# Patient Record
Sex: MALE | Race: WHITE | NOT HISPANIC OR LATINO | Employment: UNEMPLOYED | URBAN - METROPOLITAN AREA
[De-identification: names, ages, dates, MRNs, and addresses within clinical notes are randomized per-mention and may not be internally consistent; named-entity substitution may affect disease eponyms.]

---

## 2023-09-11 ENCOUNTER — HOSPITAL ENCOUNTER (EMERGENCY)
Facility: CLINIC | Age: 23
Discharge: HOME | End: 2023-09-11
Payer: COMMERCIAL

## 2023-09-11 VITALS
SYSTOLIC BLOOD PRESSURE: 116 MMHG | DIASTOLIC BLOOD PRESSURE: 76 MMHG | HEART RATE: 68 BPM | RESPIRATION RATE: 16 BRPM | TEMPERATURE: 98 F | OXYGEN SATURATION: 100 %

## 2023-09-11 DIAGNOSIS — B00.1 RECURRENT COLD SORES: ICD-10-CM

## 2023-09-11 DIAGNOSIS — Z11.3 SCREEN FOR STD (SEXUALLY TRANSMITTED DISEASE): ICD-10-CM

## 2023-09-11 PROCEDURE — 250N000009 HC RX 250

## 2023-09-11 PROCEDURE — 96372 THER/PROPH/DIAG INJ SC/IM: CPT

## 2023-09-11 PROCEDURE — 87491 CHLMYD TRACH DNA AMP PROBE: CPT

## 2023-09-11 PROCEDURE — 250N000011 HC RX IP 250 OP 636: Mod: JZ

## 2023-09-11 PROCEDURE — 99204 OFFICE O/P NEW MOD 45 MIN: CPT

## 2023-09-11 PROCEDURE — G0463 HOSPITAL OUTPT CLINIC VISIT: HCPCS | Mod: 25

## 2023-09-11 RX ORDER — ACYCLOVIR 400 MG/1
400 TABLET ORAL EVERY 8 HOURS
Qty: 21 TABLET | Refills: 0 | Status: SHIPPED | OUTPATIENT
Start: 2023-09-11 | End: 2023-09-18

## 2023-09-11 RX ORDER — DOXYCYCLINE 100 MG/1
100 CAPSULE ORAL 2 TIMES DAILY
Qty: 14 CAPSULE | Refills: 0 | Status: SHIPPED | OUTPATIENT
Start: 2023-09-11 | End: 2023-09-18

## 2023-09-11 RX ADMIN — LIDOCAINE HYDROCHLORIDE 500 MG: 10 INJECTION, SOLUTION EPIDURAL; INFILTRATION; INTRACAUDAL; PERINEURAL at 18:01

## 2023-09-11 ASSESSMENT — ACTIVITIES OF DAILY LIVING (ADL): ADLS_ACUITY_SCORE: 35

## 2023-09-11 NOTE — DISCHARGE INSTRUCTIONS
Follow-up with the HIV specialist as scheduled tomorrow.  Rocephin shot and doxycycline prescription for prophylactic treatment of gonorrhea and chlamydia.  Acyclovir as prescribed for cold sores.  Return for any new concerns.

## 2023-09-12 ENCOUNTER — TELEPHONE (OUTPATIENT)
Dept: EMERGENCY MEDICINE | Facility: CLINIC | Age: 23
End: 2023-09-12
Payer: COMMERCIAL

## 2023-09-12 LAB
C TRACH DNA SPEC QL PROBE+SIG AMP: NEGATIVE
C TRACH DNA SPEC QL PROBE+SIG AMP: POSITIVE
N GONORRHOEA DNA SPEC QL NAA+PROBE: NEGATIVE
N GONORRHOEA DNA SPEC QL NAA+PROBE: NEGATIVE

## 2023-09-12 NOTE — ED PROVIDER NOTES
History     Chief Complaint   Patient presents with    Exposure to STD     Swollen and tender anus. White discharge with BM.     Mouth Lesions     HPI  Ward Nunn is a 22 year old male who presents to urgent care for concern of sexually transmitted diseases and mouth lesions.  Patient states for the past few weeks he has noted some rectal tenderness as well as a white discharge with bowel movements intermittently.  Patient states he has had chlamydia in the past and had similar symptoms with that.  Patient denies any recent rectal penetrative intercourse for the past several months.  Denies any dysuria or penile discharge.  He denies any abdominal pain.  Patient does report that he recently tested positive for HIV and is scheduled to see an HIV specialist tomorrow.  He has noted some oral lesions ongoing for the past several weeks as well.  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  States he did try a course of valacyclovir which did not seem to provide him with much relief and is requesting to try a different antiviral at this time.  He has not had any fevers.  He does report feeling generally fatigued, but does attribute this to his recent HIV diagnosis.  He otherwise has no other new concerns today.    Allergies:  No Known Allergies    Problem List:    There are no problems to display for this patient.       Past Medical History:    No past medical history on file.    Past Surgical History:    No past surgical history on file.    Family History:    No family history on file.    Social History:  Marital Status:  Single [1]        Medications:    acyclovir (ZOVIRAX) 400 MG tablet  doxycycline hyclate (VIBRAMYCIN) 100 MG capsule          Review of Systems   All other systems reviewed and are negative.    See HPI for details  Physical Exam   BP: 116/76  Pulse: 68  Temp: 98  F (36.7  C)  Resp: 16  SpO2: 100 %      Physical Exam  Constitutional:        General: He is not in acute distress.     Appearance: He is well-developed. He is not diaphoretic.   HENT:      Head: Normocephalic and atraumatic.      Mouth/Throat:      Mouth: Mucous membranes are moist. Oral lesions present.      Pharynx: No oropharyngeal exudate or posterior oropharyngeal erythema.      Comments: Vesicular oral lesions noted on both the inner upper and lower lip mucosa.  He does have 1 at the oral commissure on the right.  Eyes:      General: No scleral icterus.     Conjunctiva/sclera: Conjunctivae normal.   Cardiovascular:      Rate and Rhythm: Normal rate.   Pulmonary:      Effort: Pulmonary effort is normal. No respiratory distress.   Musculoskeletal:      Cervical back: Normal range of motion and neck supple.   Skin:     General: Skin is warm and dry.      Capillary Refill: Capillary refill takes less than 2 seconds.      Findings: No rash.   Neurological:      Mental Status: He is alert and oriented to person, place, and time.         ED Course                 Procedures         No results found for this or any previous visit (from the past 24 hour(s)).    Medications   cefTRIAXone (ROCEPHIN) 500 mg in lidocaine injection (500 mg Intramuscular $Given 9/11/23 5544)       Assessments & Plan (with Medical Decision Making)   Ward Nunn is a 22 year old male who presents to urgent care for concern of sexually transmitted diseases and mouth lesions.  Patient states for the past few weeks he has noted some rectal tenderness as well as a white discharge with bowel movements intermittently.  Patient states he has had chlamydia in the past and had similar symptoms with that.  Patient denies any recent rectal penetrative intercourse for the past several months.  Denies any dysuria or penile discharge.  He denies any abdominal pain.  Patient does report that he recently tested positive for HIV and is scheduled to see an HIV specialist tomorrow.  He has noted some oral lesions ongoing for the  past several weeks as well.  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  States he did try a course of valacyclovir which did not seem to provide him with much relief and is requesting to try a different antiviral at this time.  He has not had any fevers.  He does report feeling generally fatigued, but does attribute this to his recent HIV diagnosis.  He otherwise has no other new concerns today.    Patient is afebrile on arrival and vital signs otherwise reassuring.  History and physical as above.  Patient is noted to have several oral lesions consistent with that of cold sores and HSV infection.  Patient is requesting to try a different antiviral from valacyclovir since he has already tried treatment with this and it has not been effective.  Acyclovir prescribed for an additional 7 days.  I do have some suspicion that patient may also have some cheilitis given an unresolved lesion at the lateral commissure despite antiviral treatments.  Patient does, however, continue to request antiviral treatments at this time.  Testing for gonorrhea and chlamydia both with a rectal swab and urine were collected today.  Results are pending.  Discussed treatment options with the patient and he elects to receive treatment at this time given his active symptoms of rectal discomfort and discharge.  Rocephin given in department today and doxycycline sent to pharmacy.  Advised patient to continue to plan to see HIV infectious disease specialist tomorrow as scheduled.  Should return for new or worsening symptoms or any other new concerns.  Return precautions were reviewed.  Patient was discharged in good condition and is agreeable to the above plan.    I have reviewed the nursing notes.    I have reviewed the findings, diagnosis, plan and need for follow up with the patient.      Discharge Medication List as of 9/11/2023  5:48 PM        START taking these medications     Details   acyclovir (ZOVIRAX) 400 MG tablet Take 1 tablet (400 mg) by mouth every 8 hours for 7 days, Disp-21 tablet, R-0, E-Prescribe      doxycycline hyclate (VIBRAMYCIN) 100 MG capsule Take 1 capsule (100 mg) by mouth 2 times daily for 7 days, Disp-14 capsule, R-0, E-Prescribe             Final diagnoses:   Recurrent cold sores   Screen for STD (sexually transmitted disease)       9/11/2023   [facility] EMERGENCY DEPT    Disclaimer:  This note consists of symbols derived from keyboarding, dictation and/or voice recognition software.  As a result, there may be errors in the script that have gone undetected.  Please consider this when interpreting information found in this chart.         Fredrick Thurston, THOMAS CNP  09/11/23 9466

## 2023-09-12 NOTE — TELEPHONE ENCOUNTER
[facility] Emergency Department/Urgent Care Lab result notification:    Reason for call  Notify the patient/parent of lab results  Assess patient symptoms (if applicable)  Review ED providers recommendations/discharge instructions (if necessary)  Advise per [facility] ED lab result protocol    Lab result  Final N. Gonorrhoeae PCR is [NEGATIVE] AND Chlamydia T PCR is [POSITIVE]  (Specimen source: Rectum swab)  Patient was treated for N. Gonorrhea AND/OR Chlamydia T in the [facility] Emergency Dept  [Yes or No]:  Yes    If Yes, list what was given in the ED:  Rocephin 500 mg IM x 1  [facility] Emergency Dept discharge antibiotic (if prescribed): Doxycycline 100 mg PO tablet, 1 tablet (100 mg) by mouth 2 times daily for 7 days  Recommendations in treatment per [facility] ED lab result Chlamydia T. AND/OR N. Gonorrhea protocol  1:48p  Relayed to patient results. He stated that he feels some slight improvement already. He will finish the course of doxycycline as directed.   STD Patient Instructions:  We recommend that you contact any recent sexual partners within the last 2 months and have them evaluated by a physician.  Avoid sexual activity for 7 to 10 days or until both you and your partner(s) have completed all antibiotic medications.  Contact PCP or return to the Emergency Dept within 24 hours if you are experiencing persistent or recurrent symptoms soon after completing therapy.  We advise that you consider following up with your PCP at approximately 3 months for retesting to be sure the infection has cleared.   Patient stated understanding and had nor further concerns.      Susannah Dye RN  [facility] Result [facility]  [facility] Emergency Dept Lab Result RN  # [phone]